# Patient Record
Sex: MALE | Race: WHITE | NOT HISPANIC OR LATINO | URBAN - NONMETROPOLITAN AREA
[De-identification: names, ages, dates, MRNs, and addresses within clinical notes are randomized per-mention and may not be internally consistent; named-entity substitution may affect disease eponyms.]

---

## 2020-11-17 ENCOUNTER — IMPORTED ENCOUNTER (OUTPATIENT)
Dept: URBAN - NONMETROPOLITAN AREA CLINIC 1 | Facility: CLINIC | Age: 75
End: 2020-11-17

## 2020-11-17 PROBLEM — H18.453: Noted: 2020-11-17

## 2020-11-17 PROBLEM — H43.813: Noted: 2020-11-17

## 2020-11-17 PROBLEM — H52.4: Noted: 2020-11-17

## 2020-11-17 PROBLEM — Z96.1: Noted: 2020-11-17

## 2020-11-17 PROCEDURE — 92014 COMPRE OPH EXAM EST PT 1/>: CPT

## 2020-11-17 PROCEDURE — 92015 DETERMINE REFRACTIVE STATE: CPT

## 2020-11-17 NOTE — PATIENT DISCUSSION
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 Signs/symptoms associated discussed. Recommend further evaluation with cornea specialist.Continue to monitor. PVD OU:  Discussed findings of exam in detail with the patient. The risk of retinal detachment in patients with PVDs was discussed with the patient and the warning signs of retinal detachment were carefully reviewed with the patient. The patient was warned to return to the office or contact the ophthalmologist on call immediately if they experience signs of retinal detachment or changes in vision noted from today. Continue to monitor PRN. P/C IOL OUDiscussed diagnosis in detail with patient. Both intraocular implants in place and stable. Continue to monitor. Presbyopia OUDiscussed refractive status in detail with patient. New glasses Rx given today. Continue to monitor.

## 2022-04-10 ASSESSMENT — TONOMETRY
OD_IOP_MMHG: 12
OS_IOP_MMHG: 12

## 2022-04-10 ASSESSMENT — VISUAL ACUITY
OD_SC: 20/80
OS_SC: 20/40-